# Patient Record
Sex: MALE | Race: WHITE | Employment: OTHER | URBAN - METROPOLITAN AREA
[De-identification: names, ages, dates, MRNs, and addresses within clinical notes are randomized per-mention and may not be internally consistent; named-entity substitution may affect disease eponyms.]

---

## 2020-09-23 ENCOUNTER — APPOINTMENT (OUTPATIENT)
Dept: GENERAL RADIOLOGY | Age: 30
End: 2020-09-23

## 2020-09-23 ENCOUNTER — APPOINTMENT (OUTPATIENT)
Dept: CT IMAGING | Age: 30
End: 2020-09-23

## 2020-09-23 ENCOUNTER — HOSPITAL ENCOUNTER (EMERGENCY)
Age: 30
Discharge: HOME | End: 2020-09-23
Attending: EMERGENCY MEDICINE

## 2020-09-23 VITALS
SYSTOLIC BLOOD PRESSURE: 118 MMHG | BODY MASS INDEX: 25.44 KG/M2 | DIASTOLIC BLOOD PRESSURE: 68 MMHG | OXYGEN SATURATION: 99 % | RESPIRATION RATE: 14 BRPM | HEART RATE: 68 BPM | WEIGHT: 187.83 LBS | HEIGHT: 72 IN | TEMPERATURE: 99 F

## 2020-09-23 PROCEDURE — 6370000000 HC RX 637 (ALT 250 FOR IP): Performed by: EMERGENCY MEDICINE

## 2020-09-23 PROCEDURE — 73700 CT LOWER EXTREMITY W/O DYE: CPT

## 2020-09-23 PROCEDURE — 73630 X-RAY EXAM OF FOOT: CPT

## 2020-09-23 PROCEDURE — 73610 X-RAY EXAM OF ANKLE: CPT

## 2020-09-23 PROCEDURE — 99284 EMERGENCY DEPT VISIT MOD MDM: CPT

## 2020-09-23 RX ORDER — OXYCODONE HYDROCHLORIDE AND ACETAMINOPHEN 5; 325 MG/1; MG/1
1 TABLET ORAL EVERY 6 HOURS PRN
Qty: 12 TABLET | Refills: 0 | Status: SHIPPED | OUTPATIENT
Start: 2020-09-23 | End: 2020-09-26

## 2020-09-23 RX ORDER — ACETAMINOPHEN 500 MG
1000 TABLET ORAL ONCE
Status: COMPLETED | OUTPATIENT
Start: 2020-09-23 | End: 2020-09-23

## 2020-09-23 RX ADMIN — ACETAMINOPHEN 1000 MG: 500 TABLET, FILM COATED ORAL at 12:23

## 2020-09-23 ASSESSMENT — PAIN SCALES - GENERAL
PAINLEVEL_OUTOF10: 6
PAINLEVEL_OUTOF10: 6
PAINLEVEL_OUTOF10: 5
PAINLEVEL_OUTOF10: 6

## 2020-09-23 ASSESSMENT — PAIN DESCRIPTION - PAIN TYPE: TYPE: ACUTE PAIN

## 2020-09-23 ASSESSMENT — PAIN - FUNCTIONAL ASSESSMENT
PAIN_FUNCTIONAL_ASSESSMENT: PREVENTS OR INTERFERES WITH MANY ACTIVE NOT PASSIVE ACTIVITIES
PAIN_FUNCTIONAL_ASSESSMENT: 0-10

## 2020-09-23 ASSESSMENT — PAIN DESCRIPTION - LOCATION: LOCATION: FOOT

## 2020-09-23 ASSESSMENT — PAIN DESCRIPTION - ORIENTATION: ORIENTATION: RIGHT;LEFT

## 2020-09-23 ASSESSMENT — PAIN DESCRIPTION - FREQUENCY: FREQUENCY: INTERMITTENT

## 2020-09-23 ASSESSMENT — PAIN DESCRIPTION - DESCRIPTORS: DESCRIPTORS: SHARP;CONSTANT

## 2020-09-23 NOTE — ED NOTES
Discharge and education instructions reviewed. Patient verbalized understanding, teach back successful. Patient denied questions at this time. Instructed to follow up with PCP and or return to ED if symptoms worsen. Ambulatory to exit with bilateral walking boots on.  States \"feels so much supported/better with boots on\" Pain subsided to [id number], RN  09/23/20 6143

## 2020-09-23 NOTE — ED PROVIDER NOTES
[facility]  eMERGENCY dEPARTMENT eNCOUnter      Pt Name: Tien Watkins  MRN: [id number]  [place] 1990  Date of evaluation: 9/23/2020  Provider: Macario Mcdermott MD    CHIEF COMPLAINT       Chief Complaint   Patient presents with    Foot Injury     c/o bilateral feet injury sustained last night around 11 pm. He was helping his neighbor was cut tree and patient was on ladder when the branch fell off and hit ladder so patient jumped off approximately 10-15 feet of the ground, denies loc         HISTORY OF PRESENT ILLNESS  (Location/Symptom, Timing/Onset, Context/Setting, Quality, Duration, Modifying Factors, Severity.)   Tien Watkins is a 27 y.o. male who presents to the emergency department complaining of pain in both ankles after jumping off a ladder last night around 11 PM.  He was up on a ladder cutting a tree with a chainsaw. He states she was probably 10 to 15 feet up in the air. Part of the tree began to fall on the ladder, so he jumped off the ladder. He states she landed on his feet and then rolled. He states he really did not have much pain initially, but about an hour afterwards he began having severe pain in both ankles radiating down into his feet. He is really not having any pain in his heels. He denies any low back pain, mid back pain, or neck pain. No head injury. No chest or abdominal pain. No other complaints. He is not taking anything for the pain. He states the pain in his ankles was so severe that it kept him up most the night. He states the pain is equally severe in the right and left ankles. Nursing Notes were reviewed and I agree. REVIEW OF SYSTEMS    (2-9 systems for level 4, 10 or more for level 5)     HEENT: No head or facial injury. Cardiovascular: No chest or rib pain. Pulmonary: No shortness of breath. GI: No abdominal pain nausea vomiting. Musculoskeletal: No neck or back pain. No hip or knee pain.   He has severe bilateral ankle pain. He has some pain in his midfoot area. He denies any significant pain in his heels. Skin: No bruising, lacerations, or abrasions. Neuro: No extremity weakness numbness or tingling. Except as noted above the remainder of the review of systems was reviewed and negative. PAST MEDICAL HISTORY   No past medical history on file. SURGICAL HISTORY     No past surgical history on file. CURRENT MEDICATIONS       Previous Medications    No medications on file       ALLERGIES     Naprosyn [naproxen]    FAMILY HISTORY     No family history on file. No family status information on file. SOCIAL HISTORY      reports that he has been smoking cigarettes. He has been smoking about 0.50 packs per day. He has never used smokeless tobacco. He reports current alcohol use. He reports that he does not use drugs. PHYSICAL EXAM    (up to 7 for level 4, 8 or more for level 5)     ED Triage Vitals [09/23/20 1150]   BP Temp Temp Source Pulse Resp SpO2 Height Weight   122/80 99 °F (37.2 °C) Oral 82 14 98 % 6' (1.829 m) 187 lb 13.3 oz (85.2 kg)       General: Alert thin white male no acute distress. Head: Atraumatic and normocephalic. Eyes: No conjunctival injection. Pupils equal round reactive. Extraocular movements are intact. ENT: TMs are normal.  Nose clear. Oropharynx is negative. No facial trauma. Neck: Supple, nontender, no adenopathy. Chest wall: Nontender. Heart: Regular rate and rhythm. No murmurs or gallops noted. Lungs: Breath sounds equal bilaterally and clear. Abdomen: Soft, nondistended, nontender. Musculoskeletal: Full range of motion of both hips and knees without pain. No bony tenderness in the hips or knees. He has mild diffuse swelling of both ankles, he has medial and lateral ankle tenderness bilaterally. He has limited movement of the ankle secondary to pain. He has no significant tenderness to the calcaneus.   He has some mild diffuse tenderness in the midfoot area bilaterally without swelling. Distal foot is nontender. He has intact distal pulses. He moves his toes well. Skin: Warm and dry, good turgor. No bruising, lacerations, or abrasions to the lower extremities. Neuro: Awake, alert, oriented. Symmetrical reactive pupils. Intact extraocular movements. No facial asymmetry. Symmetrical motor function. Limping gait. DIAGNOSTIC RESULTS     RADIOLOGY:   Non-plain film images such as CT, Ultrasound and MRI are read by the radiologist. Plain radiographic images are visualized and preliminarily interpreted by Adriel Pinto MD with the below findings:      Interpretation per the Radiologist below, if available at the time of this note:    CT FOOT LEFT WO CONTRAST   Final Result   No acute osseous abnormality. CT ANKLE RIGHT WO CONTRAST   Final Result   1. No acute osseous abnormality. 2. Mild lateral subcutaneous edema. XR FOOT RIGHT (MIN 3 VIEWS)   Final Result   Right ankle: Mild lateral soft tissue swelling. Age indeterminate tiny   slightly displaced fracture of the anterior tibia with a 4 mm curvilinear   fragment. Right foot: Age indeterminate 4 mm tiny linear fracture fragment versus   chronic heterotopic soft tissue ossification along the dorsal aspect of the   navicular. XR ANKLE LEFT (MIN 3 VIEWS)   Final Result   Possible nondisplaced fracture of the calcaneus      Otherwise, no acute bony or joint abnormality         XR ANKLE RIGHT (MIN 3 VIEWS)   Final Result   Right ankle: Mild lateral soft tissue swelling. Age indeterminate tiny   slightly displaced fracture of the anterior tibia with a 4 mm curvilinear   fragment. Right foot: Age indeterminate 4 mm tiny linear fracture fragment versus   chronic heterotopic soft tissue ossification along the dorsal aspect of the   navicular.          XR FOOT LEFT (MIN 3 VIEWS)   Final Result   Possible nondisplaced fracture of the calcaneus      Otherwise, no acute bony or joint abnormality             LABS:  Labs Reviewed - No data to display    All other labs were within normal range or not returned as of this dictation. EMERGENCY DEPARTMENT COURSE and DIFFERENTIAL DIAGNOSIS/MDM:   Vitals:    Vitals:    09/23/20 1150 09/23/20 1315   BP: 122/80 118/68   Pulse: 82 68   Resp: 14 14   Temp: 99 °F (37.2 °C)    TempSrc: Oral    SpO2: 98% 99%   Weight: 187 lb 13.3 oz (85.2 kg)    Height: 6' (1.829 m)        This patient jumped off of a ladder about 10 to 15 feet up and landed on his feet. The injury occurred last night. He was up most of the night with pain in his ankles. He has some swelling in his ankles bilaterally. He is able to weight-bear and he was able to walk in. He has significant pain with range of motion. Initial x-rays of his ankles and feet bilaterally showed some questionable subtle findings. I elected to do a CT of his right ankle and a CT of his left foot based on these findings. The CT scan of the right ankle showed no evidence of acute fracture or other acute abnormality. The CT of the left foot showed no evidence of acute fracture or other acute abnormality. He has no calcaneal tenderness of significance. He has no back or neck pain or other injuries. Due to the amount of swelling and pain he has I still have some concern about occult fracture. I am going to place him in walker boots bilaterally. He has crutches at home as needed. I wrote for short-term pain control. I gave him an orthopedic referral.  I instructed him to call the orthopedic for follow-up. I told him he may need further imaging including an MRI if he has continued significant pain. His pain is significant enough that I am still concerned about occult fracture.     Controlled Substance Monitoring:    Acute and Chronic Pain Monitoring:   RX Monitoring 9/23/2020   Periodic Controlled Substance Monitoring Possible medication side effects, risk of tolerance/dependence & alternative

## 2020-09-23 NOTE — ED NOTES
Presents with bilateral feet injury sustained last night around 11 pm. He was helping his neighbor cut a tree and was on top of a ladder when a branch hit the ladder so then he jumped off approximately 10-15 feet, landed on his feet. Denies head injury no loss of consciousness, denies neck pain, denies back pain. C/o pain all night. Bilateral feet tender to touch with mild edema, pedal pulses present. Cap refill less than 2 secs.  Denies other injury     Bobby Sellers, [place]  09/23/20 [address] [facility], [place]  09/23/20 1300

## 2020-10-13 ENCOUNTER — APPOINTMENT (OUTPATIENT)
Dept: CT IMAGING | Age: 30
End: 2020-10-13

## 2020-10-13 ENCOUNTER — HOSPITAL ENCOUNTER (EMERGENCY)
Age: 30
Discharge: ANOTHER HOSPITAL | End: 2020-10-13
Attending: EMERGENCY MEDICINE

## 2020-10-13 ENCOUNTER — APPOINTMENT (OUTPATIENT)
Dept: GENERAL RADIOLOGY | Age: 30
End: 2020-10-13

## 2020-10-13 VITALS
SYSTOLIC BLOOD PRESSURE: 142 MMHG | OXYGEN SATURATION: 98 % | BODY MASS INDEX: 25.77 KG/M2 | DIASTOLIC BLOOD PRESSURE: 81 MMHG | WEIGHT: 190.26 LBS | TEMPERATURE: 98.9 F | RESPIRATION RATE: 18 BRPM | HEIGHT: 72 IN | HEART RATE: 98 BPM

## 2020-10-13 PROCEDURE — 6370000000 HC RX 637 (ALT 250 FOR IP): Performed by: NURSE PRACTITIONER

## 2020-10-13 PROCEDURE — 99284 EMERGENCY DEPT VISIT MOD MDM: CPT

## 2020-10-13 PROCEDURE — 70450 CT HEAD/BRAIN W/O DYE: CPT

## 2020-10-13 PROCEDURE — 71046 X-RAY EXAM CHEST 2 VIEWS: CPT

## 2020-10-13 PROCEDURE — 71250 CT THORAX DX C-: CPT

## 2020-10-13 RX ORDER — OXYCODONE HYDROCHLORIDE AND ACETAMINOPHEN 5; 325 MG/1; MG/1
1 TABLET ORAL ONCE
Status: COMPLETED | OUTPATIENT
Start: 2020-10-13 | End: 2020-10-13

## 2020-10-13 RX ORDER — LIDOCAINE 4 G/G
1 PATCH TOPICAL DAILY
Status: DISCONTINUED | OUTPATIENT
Start: 2020-10-14 | End: 2020-10-13

## 2020-10-13 RX ORDER — CYCLOBENZAPRINE HCL 10 MG
10 TABLET ORAL ONCE
Status: COMPLETED | OUTPATIENT
Start: 2020-10-13 | End: 2020-10-13

## 2020-10-13 RX ADMIN — OXYCODONE HYDROCHLORIDE AND ACETAMINOPHEN 1 TABLET: 5; 325 TABLET ORAL at 21:15

## 2020-10-13 RX ADMIN — CYCLOBENZAPRINE HYDROCHLORIDE 10 MG: 10 TABLET, FILM COATED ORAL at 21:15

## 2020-10-13 ASSESSMENT — ENCOUNTER SYMPTOMS
FACIAL SWELLING: 0
BACK PAIN: 1
ABDOMINAL PAIN: 0
ABDOMINAL DISTENTION: 0
VOMITING: 0
SHORTNESS OF BREATH: 0
COLOR CHANGE: 0
COUGH: 0
NAUSEA: 0

## 2020-10-13 ASSESSMENT — PAIN SCALES - GENERAL: PAINLEVEL_OUTOF10: 7
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
## 2022-03-17 ENCOUNTER — HOSPITAL ENCOUNTER (EMERGENCY)
Age: 32
Discharge: HOME | End: 2022-03-18
Attending: EMERGENCY MEDICINE

## 2022-03-17 ENCOUNTER — APPOINTMENT (OUTPATIENT)
Dept: GENERAL RADIOLOGY | Age: 32
End: 2022-03-17

## 2022-03-17 DIAGNOSIS — S82.891A CLOSED FRACTURE OF RIGHT ANKLE, INITIAL ENCOUNTER: Primary | ICD-10-CM

## 2022-03-17 PROCEDURE — 99284 EMERGENCY DEPT VISIT MOD MDM: CPT

## 2022-03-17 PROCEDURE — 73610 X-RAY EXAM OF ANKLE: CPT

## 2022-03-17 PROCEDURE — 73630 X-RAY EXAM OF FOOT: CPT

## 2022-03-17 ASSESSMENT — PAIN DESCRIPTION - FREQUENCY: FREQUENCY: CONTINUOUS

## 2022-03-17 ASSESSMENT — PAIN DESCRIPTION - ORIENTATION: ORIENTATION: RIGHT

## 2022-03-17 ASSESSMENT — PAIN DESCRIPTION - PAIN TYPE: TYPE: ACUTE PAIN

## 2022-03-17 ASSESSMENT — PAIN DESCRIPTION - DESCRIPTORS: DESCRIPTORS: ACHING

## 2022-03-17 ASSESSMENT — PAIN - FUNCTIONAL ASSESSMENT: PAIN_FUNCTIONAL_ASSESSMENT: 0-10

## 2022-03-17 ASSESSMENT — PAIN SCALES - GENERAL: PAINLEVEL_OUTOF10: 10

## 2022-03-17 ASSESSMENT — PAIN DESCRIPTION - LOCATION: LOCATION: ANKLE

## 2022-03-17 NOTE — LETTER
[facility] Emergency Department  [address] [address]  Phone: [phone]             March 18, 2022    Patient: Sarahy Sharp   YOB: 1990   Date of Visit: 3/17/2022       To Whom It May Concern:    Mary Barlow was seen and treated in our emergency department on 3/17/2022. He may return to work after seeing orthopedic surgery.      Sincerely,             Signature:__________________________________

## 2022-03-18 ENCOUNTER — OFFICE VISIT (OUTPATIENT)
Dept: ORTHOPEDIC SURGERY | Age: 32
End: 2022-03-18

## 2022-03-18 VITALS
OXYGEN SATURATION: 100 % | BODY MASS INDEX: 25.8 KG/M2 | RESPIRATION RATE: 14 BRPM | TEMPERATURE: 97.8 F | HEIGHT: 72 IN | HEART RATE: 88 BPM | DIASTOLIC BLOOD PRESSURE: 74 MMHG | SYSTOLIC BLOOD PRESSURE: 138 MMHG

## 2022-03-18 VITALS — BODY MASS INDEX: 25.73 KG/M2 | HEIGHT: 72 IN | WEIGHT: 190 LBS

## 2022-03-18 DIAGNOSIS — S82.61XA CLOSED DISPLACED FRACTURE OF LATERAL MALLEOLUS OF RIGHT FIBULA, INITIAL ENCOUNTER: Primary | ICD-10-CM

## 2022-03-18 DIAGNOSIS — F17.200 CURRENT SMOKER: ICD-10-CM

## 2022-03-18 DIAGNOSIS — S92.251A CLOSED AVULSION FRACTURE OF NAVICULAR BONE OF RIGHT FOOT, INITIAL ENCOUNTER: ICD-10-CM

## 2022-03-18 PROCEDURE — 99406 BEHAV CHNG SMOKING 3-10 MIN: CPT | Performed by: ORTHOPAEDIC SURGERY

## 2022-03-18 PROCEDURE — 28450 TX TARSAL B1 FX W/O MNPJ EA: CPT | Performed by: ORTHOPAEDIC SURGERY

## 2022-03-18 PROCEDURE — 99203 OFFICE O/P NEW LOW 30 MIN: CPT | Performed by: ORTHOPAEDIC SURGERY

## 2022-03-18 PROCEDURE — 6370000000 HC RX 637 (ALT 250 FOR IP): Performed by: EMERGENCY MEDICINE

## 2022-03-18 PROCEDURE — 27786 TREATMENT OF ANKLE FRACTURE: CPT | Performed by: ORTHOPAEDIC SURGERY

## 2022-03-18 PROCEDURE — L4361 PNEUMA/VAC WALK BOOT PRE OTS: HCPCS | Performed by: ORTHOPAEDIC SURGERY

## 2022-03-18 RX ORDER — OXYCODONE HYDROCHLORIDE AND ACETAMINOPHEN 5; 325 MG/1; MG/1
1 TABLET ORAL ONCE
Status: COMPLETED | OUTPATIENT
Start: 2022-03-18 | End: 2022-03-18

## 2022-03-18 RX ORDER — TRAMADOL HYDROCHLORIDE 50 MG/1
50 TABLET ORAL EVERY 4 HOURS PRN
Qty: 5 TABLET | Refills: 0 | Status: SHIPPED | OUTPATIENT
Start: 2022-03-18 | End: 2022-03-21

## 2022-03-18 RX ORDER — HYDROCODONE BITARTRATE AND ACETAMINOPHEN 5; 325 MG/1; MG/1
1 TABLET ORAL EVERY 4 HOURS PRN
Qty: 6 TABLET | Refills: 0 | Status: SHIPPED | OUTPATIENT
Start: 2022-03-18 | End: 2022-03-18

## 2022-03-18 RX ORDER — HYDROCODONE BITARTRATE AND ACETAMINOPHEN 5; 325 MG/1; MG/1
1 TABLET ORAL EVERY 6 HOURS PRN
Qty: 20 TABLET | Refills: 0 | Status: SHIPPED | OUTPATIENT
Start: 2022-03-18 | End: 2022-03-23

## 2022-03-18 RX ADMIN — OXYCODONE AND ACETAMINOPHEN 1 TABLET: 5; 325 TABLET ORAL at 00:28

## 2022-03-18 ASSESSMENT — PAIN DESCRIPTION - PROGRESSION: CLINICAL_PROGRESSION: GRADUALLY IMPROVING

## 2022-03-18 ASSESSMENT — PAIN DESCRIPTION - FREQUENCY: FREQUENCY: INTERMITTENT

## 2022-03-18 ASSESSMENT — PAIN DESCRIPTION - ONSET: ONSET: ON-GOING

## 2022-03-18 ASSESSMENT — PAIN DESCRIPTION - ORIENTATION: ORIENTATION: RIGHT

## 2022-03-18 ASSESSMENT — ENCOUNTER SYMPTOMS
DIARRHEA: 0
APNEA: 0
BACK PAIN: 0
CONSTIPATION: 0
EYE DISCHARGE: 0
SHORTNESS OF BREATH: 0
ABDOMINAL PAIN: 0
RECTAL PAIN: 0
COUGH: 0
CHEST TIGHTNESS: 0
PHOTOPHOBIA: 0
BLOOD IN STOOL: 0
EYE PAIN: 0
ANAL BLEEDING: 0
WHEEZING: 0
EYE REDNESS: 0
EYE ITCHING: 0
NAUSEA: 0
STRIDOR: 0
CHOKING: 0
COLOR CHANGE: 0
VOMITING: 0
ABDOMINAL DISTENTION: 0

## 2022-03-18 ASSESSMENT — PAIN DESCRIPTION - LOCATION: LOCATION: ANKLE

## 2022-03-18 ASSESSMENT — PAIN - FUNCTIONAL ASSESSMENT: PAIN_FUNCTIONAL_ASSESSMENT: PREVENTS OR INTERFERES WITH MANY ACTIVE NOT PASSIVE ACTIVITIES

## 2022-03-18 ASSESSMENT — PAIN DESCRIPTION - DESCRIPTORS: DESCRIPTORS: ACHING

## 2022-03-18 ASSESSMENT — PAIN SCALES - GENERAL
PAINLEVEL_OUTOF10: 10
PAINLEVEL_OUTOF10: 2

## 2022-03-18 ASSESSMENT — PAIN DESCRIPTION - PAIN TYPE: TYPE: ACUTE PAIN

## 2022-03-18 NOTE — LETTER
[facility]  [facility] [address] [address]  Phone: [phone]  Fax: [phone]    Peterson Mcmullen MD        March 18, 2022     Patient: Sarahy Sharp   YOB: 1990   Date of Visit: 3/18/2022       To Whom It May Concern: It is my medical opinion that Mary Barlow may remain out of work for 6 weeks. (April 30th, 2022)  If you have any questions or concerns, please don't hesitate to call.     Sincerely,        Peterson Mcmullen MD

## 2022-03-18 NOTE — PROGRESS NOTES
CHIEF COMPLAINT: Right ankle pain / lateral malleolus and navicular avulsion fracture. DATE OF INJURY: 3/17/2022, DOT 3/18/2022    HISTORY:  Mr. Kristyn Jenkins 28 y.o.  male presents today for the first visit for evaluation of a right ankle injury which occurred when he was walking and tripped. He was first seen and evaluated in [facility], where he was x-rayed, splinted and asked to f/u with Orthopedics. He is complaining of medial & lateral ankle pain and swelling. This is better with elevation and worse with bearing any wt. The pain is sharp and not radiating. No numbness or tingling sensation. Alleviating factors: elevation and rest. No other complaint. History reviewed. No pertinent past medical history. Past Surgical History:   Procedure Laterality Date    ARM SURGERY      HAND SURGERY Right 2021       Social History     Socioeconomic History    Marital status:      Spouse name: Not on file    Number of children: Not on file    Years of education: Not on file    Highest education level: Not on file   Occupational History    Not on file   Tobacco Use    Smoking status: Current Every Day Smoker     Packs/day: 0.50     Types: Cigarettes    Smokeless tobacco: Never Used   Substance and Sexual Activity    Alcohol use: Yes     Comment: occasionally     Drug use: No    Sexual activity: Not on file   Other Topics Concern    Not on file   Social History Narrative    Not on file     Social Determinants of Health     Financial Resource Strain:     Difficulty of Paying Living Expenses: Not on file   Food Insecurity:     Worried About Running Out of Food in the Last Year: Not on file    Lluvia of Food in the Last Year: Not on file   Transportation Needs:     Lack of Transportation (Medical): Not on file    Lack of Transportation (Non-Medical):  Not on file   Physical Activity:     Days of Exercise per Week: Not on file    Minutes of Exercise per Session: Not on file   Stress:     Feeling of well as ecchymosis. He has intact sensation and good pedal pulses. He has significant tenderness on deep palpation over the medial & lateral malleolus of the right ankle. IMAGING: Xrays, 3 views of the right ankle dated 3/17/2022 from [facility], were reviewed, and showed a minimally displaced lateral malleolus and navicular avulsion fracture. IMPRESSION: Right ankle pain / lateral malleolus and navicular avulsion fracture. PLAN:  I discussed that the overall alignment of these fractures are good and that we can try to treat this non-operatively in a boot WBAT. We discussed the risk of nonunion and or malunion. We applied a boot today in the office and instructed him  in care. We will see him  back in 6 weeks at which time we will get a new xray of the right ankle. The patient smokes cigarettes, and we discussed with the patient the risks of smoking on general health and also on bone and soft tissue healing (delay and non-union), and promised to cut down or stop smoking. Smoking: Educated the patient regarding the hazards of smoking and that it harms their body in many ways. It increases the chance of developing heart disease, lung disease, cancer, and other health problems including poor bone and wound healing. The importance of smoking cessation for optimal bone and wound healing was stressed. This was communicated verbally, 5 Minutes. Procedures    Breg Tall Brittany Walking Boot     Patient was prescribed a Breg Tall Brittany Walking Boot. The right ankle will require stabilization / immobilization from this semi-rigid / rigid orthosis to improve their function. The orthosis will assist in protecting the affected area, provide functional support and facilitate healing. Patient was instructed to progress ambulation weight bearing as tolerated in the device.      The patient was educated and fit by a healthcare professional with expert knowledge and specialization in brace application while under the direct supervision of the physician. Verbal and written instructions for the use of and application of this item were provided. They were instructed to contact the office immediately should the brace result in increased pain, decreased sensation, increased swelling or worsening of the condition.      Casi Yang MD

## 2022-03-18 NOTE — ED PROVIDER NOTES
[address] [facility]      Pt Name: Ted Pedraza  MRN: [id number]  [place] 1990  Date of evaluation: 3/17/2022  Provider: Jennifer Monte MD    CHIEF COMPLAINT       Chief Complaint   Patient presents with    Ankle Pain     right ankle injury today swelling noted        HISTORY OF PRESENT ILLNESS    Ted Pedraza is a 28 y.o. male who presents to the emergency department with ankle pain. Ankle pain after rolling it and injuring it earlier today. 7 out of 10 achy nature. Worse with movement. Better with rest.  No other injuries or insults. Has not taken anything for pain. Started spontaneously. Constant in nature. No other associated symptoms. Nursing Notes were reviewed. Including nursing noted for FM, Surgical History, Past Medical History, Social History, vitals, and allergies; agree with all. REVIEW OF SYSTEMS       Review of Systems   Constitutional: Negative for activity change, appetite change, chills, diaphoresis, fatigue, fever and unexpected weight change. HENT: Negative for congestion, dental problem, drooling, ear discharge and ear pain. Eyes: Negative for photophobia, pain, discharge, redness, itching and visual disturbance. Respiratory: Negative for apnea, cough, choking, chest tightness, shortness of breath, wheezing and stridor. Cardiovascular: Negative for chest pain, palpitations and leg swelling. Gastrointestinal: Negative for abdominal distention, abdominal pain, anal bleeding, blood in stool, constipation, diarrhea, nausea, rectal pain and vomiting. Endocrine: Negative for cold intolerance and heat intolerance. Genitourinary: Negative for decreased urine volume and urgency. Musculoskeletal: Positive for arthralgias. Negative for back pain. Skin: Negative for color change and pallor. Neurological: Negative for tremors, facial asymmetry and weakness. Hematological: Negative for adenopathy.  Does not bruise/bleed easily. Psychiatric/Behavioral: Negative for agitation, behavioral problems, confusion and decreased concentration. Except as noted above the remainder of the review of systems was reviewed and negative. PAST MEDICAL HISTORY   History reviewed. No pertinent past medical history. SURGICAL HISTORY       Past Surgical History:   Procedure Laterality Date    ARM SURGERY         CURRENT MEDICATIONS       Previous Medications    No medications on file       ALLERGIES     Naprosyn [naproxen]    FAMILY HISTORY      History reviewed. No pertinent family history. SOCIAL HISTORY       Social History     Socioeconomic History    Marital status:      Spouse name: None    Number of children: None    Years of education: None    Highest education level: None   Occupational History    None   Tobacco Use    Smoking status: Current Every Day Smoker     Packs/day: 0.50     Types: Cigarettes    Smokeless tobacco: Never Used   Substance and Sexual Activity    Alcohol use: Yes     Comment: occasionally     Drug use: No    Sexual activity: None   Other Topics Concern    None   Social History Narrative    None     Social Determinants of Health     Financial Resource Strain:     Difficulty of Paying Living Expenses: Not on file   Food Insecurity:     Worried About Running Out of Food in the Last Year: Not on file    Lluvia of Food in the Last Year: Not on file   Transportation Needs:     Lack of Transportation (Medical): Not on file    Lack of Transportation (Non-Medical):  Not on file   Physical Activity:     Days of Exercise per Week: Not on file    Minutes of Exercise per Session: Not on file   Stress:     Feeling of Stress : Not on file   Social Connections:     Frequency of Communication with Friends and Family: Not on file    Frequency of Social Gatherings with Friends and Family: Not on file    Attends Shinto Services: Not on file   [employer] of [employer]s or Organizations: Not on file    Attends Club or Organization Meetings: Not on file    Marital Status: Not on file   Intimate Partner Violence:     Fear of Current or Ex-Partner: Not on file    Emotionally Abused: Not on file    Physically Abused: Not on file    Sexually Abused: Not on file   Housing Stability:     Unable to Pay for Housing in the Last Year: Not on file    Number of [place] in the Last Year: Not on file    Unstable Housing in the Last Year: Not on file       PHYSICAL EXAM       ED Triage Vitals [03/17/22 2141]   BP Temp Temp Source Pulse Resp SpO2 Height Weight   -- 97.8 °F (36.6 °C) Oral 96 16 100 % 6' (1.829 m) --       Physical Exam  Vitals and nursing note reviewed. Constitutional:       General: He is not in acute distress. Appearance: He is well-developed. He is not diaphoretic. HENT:      Head: Normocephalic and atraumatic. Eyes:      General:         Right eye: No discharge. Left eye: No discharge. Pupils: Pupils are equal, round, and reactive to light. Neck:      Thyroid: No thyromegaly. Trachea: No tracheal deviation. Cardiovascular:      Rate and Rhythm: Normal rate and regular rhythm. Heart sounds: No murmur heard. Pulmonary:      Breath sounds: No wheezing or rales. Chest:      Chest wall: No tenderness. Abdominal:      General: There is no distension. Palpations: Abdomen is soft. There is no mass. Tenderness: There is no abdominal tenderness. There is no guarding or rebound. Musculoskeletal:         General: Tenderness present. No deformity. Normal range of motion. Cervical back: Normal range of motion. Comments: No cuts or abrasions noted warm well perfused foot. Minimal tenderness palpation. Pulses present. Skin:     General: Skin is warm. Coloration: Skin is not pale. Findings: No erythema or rash. Neurological:      Mental Status: He is alert.       Cranial Nerves: No cranial nerve deficit. Motor: No abnormal muscle tone. Coordination: Coordination normal.         DIAGNOSTIC RESULTS     RADIOLOGY:   Non-plain film images such as CT, Ultrasoundand MRI are read by the radiologist. Plain radiographic images are visualized and preliminarily interpreted by the emergency physician with the below findings:       Impression   1. Suspected tiny subtle acute cortical fracture fragments at the caudal   aspect of the lateral malleolus. 2. Soft tissue swelling overlying the lateral ankle. 3. No acute abnormality of the right foot.         ED BEDSIDE ULTRASOUND:   Performed by ED Physician - none    LABS:  Labs Reviewed - No data to display    All other labs were withinnormal range or not returned as of this dictation. EMERGENCY DEPARTMENT COURSE and DIFFERENTIAL DIAGNOSIS/MDM:     PMH, Surgical Hx, FH, Social Hx reviewed by myself (ETOH usage, Tobacco usage, Drug usage reviewed by myself, no pertinent Hx)- No Pertinent Hx     Old records were reviewed by me     42-year-old with fracture of the right ankle. Short leg post mold. Crutches. Nonweightbearing. Orthopedic follow-up. I estimate there is LOW risk for Sepsis, MI, Stroke, Tamponade, PTX, Toxicity or other life threatening etiology thus I consider the discharge disposition reasonable. The patient is at low risk for mortality based on demographic, history and clinical factors. Given the best available information and clinical assessment, I estimate the risk of hospitalization to be greater than risk of treatment at home. I have explained to the patient that the risk could rapidly change, given precautions for return and instructions. Explained to patient that the risk for mortality is low based on demographic, history and clinical factors. I discussed with patient the results of evaluation in the ED, diagnosis, care, and prognosis. The plan is to discharge to home.   Patient is in agreement with plan and questions have been answered. I also discussed with patient the reasons which may require a return visit and the importance of follow-up care. The patient is well-appearing, nontoxic, and improved at the time of discharge. Patient agrees to call to arrange follow-up care as directed. Patient understands to return immediately for worsening/change in symptoms. CRITICAL CARE TIME   Total Critical Caretime was 21 minutes, excluding separately reportable procedures. There was a high probability of clinically significant/life threatening deterioration in the patient's condition which required my urgent intervention. PROCEDURES:  Unlessotherwise noted below, none    FINAL IMPRESSION      1.  Closed fracture of right ankle, initial encounter          DISPOSITION/PLAN   DISPOSITION Decision To Discharge 03/18/2022 12:15:25 AM    PATIENT REFERRED TO:  Peterson Mcmullen MD  [address] [facility]  [address] [place] [address]            DISCHARGE MEDICATIONS:  New Prescriptions    No medications on file          (Please note that portions ofthis note were completed with a voice recognition program.  Efforts were made to edit the dictations but occasionally words are mis-transcribed.)    Sapphire Wells MD(electronically signed)  Attending Emergency Physician        Sapphire Wells MD  03/18/22 0657

## 2022-03-20 PROBLEM — F17.200 CURRENT SMOKER: Status: ACTIVE | Noted: 2022-03-20

## 2022-03-20 PROBLEM — S92.251A CLOSED AVULSION FRACTURE OF NAVICULAR BONE OF RIGHT FOOT: Status: ACTIVE | Noted: 2022-03-20

## 2022-03-20 PROBLEM — S82.61XA CLOSED DISPLACED FRACTURE OF LATERAL MALLEOLUS OF RIGHT FIBULA: Status: ACTIVE | Noted: 2022-03-20

## 2022-03-21 NOTE — TELEPHONE ENCOUNTER
=================[id number]=================  Taken 06:41:47 pm 03/18/22 Oper. 21  Dlvrd 06:43:44 pm 03/18/22 To CMD          ALPHA-NUMERIC PAGE          Terminal No. : [id number]        Pager Number :  Fuadangel Santiagos    Message : Parish Li [phone]   SAW DR MAC THIS MORNING FOR FRA  CTURED ANKLE.  [facility] NOT CALLED IN 2 /12/1990

## 2022-06-29 ENCOUNTER — HOSPITAL ENCOUNTER (EMERGENCY)
Age: 32
Discharge: HOME | End: 2022-06-29
Payer: COMMERCIAL

## 2022-06-29 VITALS
RESPIRATION RATE: 18 BRPM | DIASTOLIC BLOOD PRESSURE: 98 MMHG | SYSTOLIC BLOOD PRESSURE: 154 MMHG | TEMPERATURE: 97.3 F | OXYGEN SATURATION: 99 % | HEART RATE: 80 BPM

## 2022-06-29 DIAGNOSIS — K02.9 DENTAL CARIES: Primary | ICD-10-CM

## 2022-06-29 DIAGNOSIS — K04.7 DENTAL INFECTION: ICD-10-CM

## 2022-06-29 DIAGNOSIS — M79.2 NEUROPATHIC PAIN OF HAND, RIGHT: ICD-10-CM

## 2022-06-29 DIAGNOSIS — K03.81 CRACKED TOOTH: ICD-10-CM

## 2022-06-29 PROCEDURE — 99283 EMERGENCY DEPT VISIT LOW MDM: CPT

## 2022-06-29 PROCEDURE — 6370000000 HC RX 637 (ALT 250 FOR IP)

## 2022-06-29 RX ORDER — CLOVE OIL
OIL (ML) MISCELLANEOUS ONCE
Status: COMPLETED | OUTPATIENT
Start: 2022-06-29 | End: 2022-06-29

## 2022-06-29 RX ORDER — LIDOCAINE 4 G/G
1 PATCH TOPICAL DAILY
Status: DISCONTINUED | OUTPATIENT
Start: 2022-06-30 | End: 2022-06-30 | Stop reason: HOSPADM

## 2022-06-29 RX ORDER — GABAPENTIN 400 MG/1
400 CAPSULE ORAL 3 TIMES DAILY
Status: DISCONTINUED | OUTPATIENT
Start: 2022-06-29 | End: 2022-06-30 | Stop reason: HOSPADM

## 2022-06-29 RX ORDER — PENICILLIN V POTASSIUM 500 MG/1
500 TABLET ORAL 4 TIMES DAILY
Qty: 28 TABLET | Refills: 0 | Status: SHIPPED | OUTPATIENT
Start: 2022-06-29 | End: 2022-07-06

## 2022-06-29 RX ORDER — ACETAMINOPHEN 500 MG
1000 TABLET ORAL ONCE
Status: COMPLETED | OUTPATIENT
Start: 2022-06-29 | End: 2022-06-29

## 2022-06-29 RX ORDER — LIDOCAINE HYDROCHLORIDE 20 MG/ML
15 SOLUTION OROPHARYNGEAL ONCE
Status: COMPLETED | OUTPATIENT
Start: 2022-06-29 | End: 2022-06-29

## 2022-06-29 RX ORDER — LIDOCAINE HYDROCHLORIDE 20 MG/ML
15 SOLUTION OROPHARYNGEAL EVERY 4 HOURS PRN
Qty: 2 EACH | Refills: 0 | Status: SHIPPED | OUTPATIENT
Start: 2022-06-29 | End: 2022-07-04

## 2022-06-29 RX ADMIN — LIDOCAINE HYDROCHLORIDE 15 ML: 20 SOLUTION ORAL at 22:03

## 2022-06-29 RX ADMIN — GABAPENTIN 400 MG: 400 CAPSULE ORAL at 22:40

## 2022-06-29 RX ADMIN — ACETAMINOPHEN 1000 MG: 500 TABLET ORAL at 22:03

## 2022-06-29 RX ADMIN — Medication: at 22:03

## 2022-06-29 ASSESSMENT — PAIN DESCRIPTION - FREQUENCY
FREQUENCY: CONTINUOUS
FREQUENCY: CONTINUOUS

## 2022-06-29 ASSESSMENT — PAIN DESCRIPTION - DESCRIPTORS
DESCRIPTORS: ACHING
DESCRIPTORS: ACHING

## 2022-06-29 ASSESSMENT — PAIN DESCRIPTION - LOCATION
LOCATION: FACE

## 2022-06-29 ASSESSMENT — PAIN - FUNCTIONAL ASSESSMENT
PAIN_FUNCTIONAL_ASSESSMENT: 0-10
PAIN_FUNCTIONAL_ASSESSMENT: 0-10

## 2022-06-29 ASSESSMENT — PAIN SCALES - GENERAL
PAINLEVEL_OUTOF10: 4
PAINLEVEL_OUTOF10: 9
PAINLEVEL_OUTOF10: 9

## 2022-06-29 ASSESSMENT — PAIN DESCRIPTION - PAIN TYPE: TYPE: ACUTE PAIN

## 2022-06-30 ASSESSMENT — ENCOUNTER SYMPTOMS
CONSTIPATION: 0
SHORTNESS OF BREATH: 0
EYE PAIN: 0
VOMITING: 0
TROUBLE SWALLOWING: 0
ABDOMINAL PAIN: 0
SORE THROAT: 0
COUGH: 0
RHINORRHEA: 0
NAUSEA: 0
BACK PAIN: 0
FACIAL SWELLING: 0
DIARRHEA: 0

## 2022-06-30 NOTE — ED PROVIDER NOTES
[address] [facility]        Pt Name: Dalia West  MRN: [id number]  [place] 1990  Date of evaluation: 6/29/2022  Provider: RADHA Schreiber CNP  PCP: No primary care provider on file. Note Started: 12:45 AM EDT      KALI. I have evaluated this patient. My supervising physician was available for consultation. Triage CHIEF COMPLAINT       Chief Complaint   Patient presents with    Dental Pain     left side x 1 day    Hand Pain     right, chromic s/o wrist fx         HISTORY OF PRESENT ILLNESS   (Location/Symptom, Timing/Onset, Context/Setting, Quality, Duration, Modifying Factors, Severity)  Note limiting factors. Dalia West is a 28 y.o. male who presents to the ED for evaluation of chronic right hand pain and dental pain. He reports dental pain which has been on for several years. He is unsure exactly how long he has had dental caries. Chart review shows that this has been a problem since at least 2018. Patient states his caries are worse. He is working 6 days a week and is unable to go see a dentist.  He also reports chronic right hand neuropathic pain for which he was taking 10 mg of Percocet and 40 mg of gabapentin every day. He cannot tell me when he last took these medications. He states he has had neuropathic pain after surgery. There is a visible surgical scar on the distal aspect of his right forearm. He cannot tell me the last time I saw PCP or pain .  PDMP does show occasional refills for narcotics as well as a last refill of 7/20/2021 for 90 gabapentin for 400 mg. Nursing Notes were all reviewed and agreed with or any disagreements were addressed in the HPI. REVIEW OF SYSTEMS    (2-9 systems for level 4, 10 or more for level 5)     Review of Systems   Constitutional: Negative for chills, diaphoresis and fever. HENT: Positive for dental problem.  Negative for congestion, facial swelling, rhinorrhea, sore throat and trouble swallowing. Eyes: Negative for pain and visual disturbance. Respiratory: Negative for cough and shortness of breath. Cardiovascular: Negative for chest pain and leg swelling. Gastrointestinal: Negative for abdominal pain, constipation, diarrhea, nausea and vomiting. Genitourinary: Negative for frequency and hematuria. Musculoskeletal: Negative for back pain and neck pain. Skin: Negative for rash and wound. Neurological: Negative for dizziness and light-headedness. Neuropathic pain right hand specifically to digits 4 and 5       PAST MEDICAL HISTORY   No past medical history on file. SURGICAL HISTORY     Past Surgical History:   Procedure Laterality Date    ARM SURGERY      HAND SURGERY Right 2021       [address]       Discharge Medication List as of 6/29/2022 10:30 PM          ALLERGIES     Naprosyn [naproxen]    FAMILYHISTORY     No family history on file. SOCIAL HISTORY       Social History     Socioeconomic History    Marital status:      Spouse name: Not on file    Number of children: Not on file    Years of education: Not on file    Highest education level: Not on file   Occupational History    Not on file   Tobacco Use    Smoking status: Current Every Day Smoker     Packs/day: 0.50     Types: Cigarettes    Smokeless tobacco: Never Used   Substance and Sexual Activity    Alcohol use: Yes     Comment: occasionally     Drug use: No    Sexual activity: Not on file   Other Topics Concern    Not on file   Social History Narrative    Not on file     Social Determinants of Health     Financial Resource Strain:     Difficulty of Paying Living Expenses: Not on file   Food Insecurity:     Worried About Running Out of Food in the Last Year: Not on file    Lluvia of Food in the Last Year: Not on file   Transportation Needs:     Lack of Transportation (Medical):  Not on file    Lack of Transportation (Non-Medical): Not on file   Physical Activity:     Days of Exercise per Week: Not on file    Minutes of Exercise per Session: Not on file   Stress:     Feeling of Stress : Not on file   Social Connections:     Frequency of Communication with Friends and Family: Not on file    Frequency of Social Gatherings with Friends and Family: Not on file    Attends Synagogue Services: Not on file    Active Member of [address] [place] [address] or Organizations: Not on file    Attends Club or Organization Meetings: Not on file    Marital Status: Not on file   Intimate Partner Violence:     Fear of Current or Ex-Partner: Not on file    Emotionally Abused: Not on file    Physically Abused: Not on file    Sexually Abused: Not on file   Housing Stability:     Unable to Pay for Housing in the Last Year: Not on file    Number of [place] in the Last Year: Not on file    Unstable Housing in the Last Year: Not on file       SCREENINGS    Kathy Coma Scale  Eye Opening: Spontaneous  Best Verbal Response: Oriented  Best Motor Response: Obeys commands  Kathy Coma Scale Score: 15        PHYSICAL EXAM    (up to 7 for level 4, 8 or more for level 5)     ED Triage Vitals [06/29/22 2050]   BP Temp Temp Source Heart Rate Resp SpO2 Height Weight   (!) 154/98 97.3 °F (36.3 °C) Oral 80 18 99 % -- --       Physical Exam  Vitals and nursing note reviewed. Constitutional:       Appearance: He is not ill-appearing or diaphoretic. HENT:      Head: Normocephalic and atraumatic. Right Ear: External ear normal.      Left Ear: External ear normal.      Nose: Nose normal.      Mouth/Throat:      Mouth: Mucous membranes are moist.      Dentition: Abnormal dentition. Dental tenderness and dental caries present. No gingival swelling, dental abscesses or gum lesions. Pharynx: Oropharynx is clear. No oropharyngeal exudate or posterior oropharyngeal erythema. Comments: Extremely poor dentition. Extensive cavities to almost all teeth. Multiple teeth with avulsed edges. These do not appear to be new. No lacerations to lips and or gums. Tooth marked above is 1 patient is complaining of most pain to and states is what brought him in  Eyes:      General:         Right eye: No discharge. Left eye: No discharge. Conjunctiva/sclera:      Right eye: Right conjunctiva is injected. Left eye: Left conjunctiva is injected. Cardiovascular:      Rate and Rhythm: Normal rate and regular rhythm. Pulses: Normal pulses. Radial pulses are 2+ on the right side and 2+ on the left side. Heart sounds: Normal heart sounds. No murmur heard. No friction rub. No gallop. Pulmonary:      Effort: Pulmonary effort is normal. No respiratory distress. Breath sounds: Normal breath sounds. No stridor. No wheezing, rhonchi or rales. Abdominal:      General: Abdomen is flat. Palpations: Abdomen is soft. Musculoskeletal:         General: No swelling or tenderness. Normal range of motion. Cervical back: Normal range of motion and neck supple. No rigidity. Comments: Neurologically sensation is at patient's baseline to the right upper extremity with tingling to digits 4 and 5 which has been ongoing for the past several years. Cap refill, motor are all intact. Skin:     General: Skin is warm and dry. Capillary Refill: Capillary refill takes less than 2 seconds. Coloration: Skin is not pale. Findings: No bruising or rash. Neurological:      General: No focal deficit present. Mental Status: He is alert and oriented to person, place, and time. Psychiatric:         Mood and Affect: Mood normal.         Behavior: Behavior normal. Behavior is cooperative. DIAGNOSTIC RESULTS   LABS:    Labs Reviewed - No data to display    When ordered, only abnormal lab results are displayed. All other labs were within normal range or not returned as of this dictation. EKG:  When ordered, EKG's are interpreted by the Emergency Department Physician in the absence of a cardiologist.  Please see their note for interpretation of EKG. RADIOLOGY:   Non-plain film images such as CT, Ultrasound and MRI are read by the radiologist. Plain radiographic images are visualized andpreliminarily interpreted by the  ED Provider with the below findings:        Interpretation perthe Radiologist below, if available at the time of this note:    No orders to display     No results found. PROCEDURES   Unless otherwise noted below, none     Procedures    CRITICAL CARE TIME   Yasir GONZALEZ CNP, am the primary clinician of record  Total Critical Care time was 15 minutes, excluding separately reportable procedures. There was a high probability of clinically significant/life threatening deterioration in the patient's condition which required my urgent intervention. This time spent assessing, reassessing patient, chart review and discussing case with other providers      CONSULTS:  None      EMERGENCY DEPARTMENT COURSE and DIFFERENTIAL DIAGNOSIS/MDM:   Vitals:    Vitals:    06/29/22 2050 06/29/22 2243   BP: (!) 154/98    Pulse: 80    Resp: 18 18   Temp: 97.3 °F (36.3 °C)    TempSrc: Oral    SpO2: 99%        Patient was given thefollowing medications:  Medications   lidocaine viscous hcl (XYLOCAINE) 2 % solution 15 mL (15 mLs Mouth/Throat Given 6/29/22 2203)   acetaminophen (TYLENOL) tablet 1,000 mg (1,000 mg Oral Given 6/29/22 2203)   clove oil liquid ( Topical Given 6/29/22 2203)         Is this patient to be included in the SEP-1 Core Measure due to severe sepsis or septic shock? No   Exclusion criteria - the patient is NOT to be included for SEP-1 Core Measure due to:  2+ SIRS criteria are not met      Regarding dental pain:    Given Tylenol, clove oil and viscous lidocaine. Good symptom relief. Differential Diagnosis: Dental Abscess, Airway Obstruction, Ramakrishna's Angina, Bacteremia/Sepsis.   Denies dysphagia, dyspnea, neck stiffness, and odynophagia. Due to the location of the dental pain patient was not offered an inferior alveolar block    Patient seen and examined. In addition to the noted physical exam, I did not observe brawny edema, uvular deviation, menigismus, stridor, trismus, or drooling. Patient is tolerating saliva. Submandibular and sublingual areas are soft. Nontoxic appearance and no significant distress. Discussed treatment options with the patient and that the definitive care for this patient's symptoms is to see a dentist or an endodontist. No sign of Ramakrishnas Angina or deep space neck infection, and there is no airway compromise. Will start on antibiotics for dental infection and possible early abscess. There is nothing that I can see or palpate on exam that requires drainage at this time. Patient to obtain dental wax to cover the tooth and protect it from temperature changes and exposure to air when possible. Pain management and follow-up plan were discussed with the patient. It is understood that if the patient is not improving as expected or if other new symptoms or signs of concern develop, specifically pain, swelling of the face or neck or a fever, other etiologies or diagnoses may need to be considered requiring other tests, treatments, consultations, and/or admission. The diagnosis, plan, expected course, follow-up, and return precautions were discussed and all questions were answered. Regarding neuropathic pain:  See HPI for PDMP review. [place] to [place] in [place] for pain control. Does appear to have multiple orthopedic injuries. Do see visit with gabapentin prescribed in July of last year in [place]. Do not see any refills after August.  As such, uncomfortable starting patient on gabapentin without pain/PCP referral.  Patient unable/unwilling to tell me where he has been getting gabapentin from. Requesting Percocet 10s multiple times.   Had discussion with patient. He is agreeable to follow-up with PCP/pain. Will get one-time dose of gabapentin here. No opiates given in ED. See PE above the patient is neurovascularly intact. He is at his baseline sensation wise. Motor, cap refill intact. No recent trauma to warrant imaging. FINAL IMPRESSION      1. Dental caries    2. Dental infection    3. Cracked tooth    4. Neuropathic pain of hand, right          DISPOSITION/PLAN   DISPOSITION Decision To Discharge 06/29/2022 10:16:04 PM      PATIENT REFERREDTO:  [facility]) Pre-Services  [phone]  Call in 2 days  to get a PCP      DISCHARGE MEDICATIONS:  Discharge Medication List as of 6/29/2022 10:30 PM      START taking these medications    Details   penicillin v potassium (VEETID) 500 MG tablet Take 1 tablet by mouth 4 times daily for 7 days, Disp-28 tablet, R-0Normal      lidocaine viscous hcl (XYLOCAINE) 2 % SOLN solution Take 15 mLs by mouth every 4 hours as needed for Dental Pain or Pain Take 15 mLs by mouth every 4 hours as needed for Irritation or Pain.   You can swish and swallow or gargle, Disp-2 each, R-0Normal             DISCONTINUED MEDICATIONS:  Discharge Medication List as of 6/29/2022 10:30 PM                 (Please note that portions ofthis note were completed with a voice recognition program.  Efforts were made to edit the dictations but occasionally words are mis-transcribed.)    RADHA Zapata CNP (electronically signed)             RADHA Zapata CNP  06/30/22 9487

## 2024-07-12 ENCOUNTER — HOSPITAL ENCOUNTER (EMERGENCY)
Age: 34
Discharge: HOME | End: 2024-07-12
Payer: COMMERCIAL

## 2024-07-12 ENCOUNTER — APPOINTMENT (OUTPATIENT)
Dept: GENERAL RADIOLOGY | Age: 34
End: 2024-07-12
Payer: COMMERCIAL

## 2024-07-12 VITALS
HEART RATE: 75 BPM | SYSTOLIC BLOOD PRESSURE: 136 MMHG | WEIGHT: 212.74 LBS | RESPIRATION RATE: 17 BRPM | HEIGHT: 73 IN | OXYGEN SATURATION: 96 % | DIASTOLIC BLOOD PRESSURE: 95 MMHG | BODY MASS INDEX: 28.2 KG/M2 | TEMPERATURE: 98.2 F

## 2024-07-12 DIAGNOSIS — Z23 NEED FOR PROPHYLACTIC VACCINATION AGAINST DIPHTHERIA AND TETANUS: ICD-10-CM

## 2024-07-12 DIAGNOSIS — S51.811A LACERATION OF RIGHT FOREARM, INITIAL ENCOUNTER: Primary | ICD-10-CM

## 2024-07-12 PROCEDURE — 6370000000 HC RX 637 (ALT 250 FOR IP): Performed by: PHYSICIAN ASSISTANT

## 2024-07-12 PROCEDURE — 99284 EMERGENCY DEPT VISIT MOD MDM: CPT

## 2024-07-12 PROCEDURE — 6360000002 HC RX W HCPCS: Performed by: PHYSICIAN ASSISTANT

## 2024-07-12 PROCEDURE — 12004 RPR S/N/AX/GEN/TRK7.6-12.5CM: CPT

## 2024-07-12 PROCEDURE — 90471 IMMUNIZATION ADMIN: CPT | Performed by: PHYSICIAN ASSISTANT

## 2024-07-12 PROCEDURE — 90715 TDAP VACCINE 7 YRS/> IM: CPT | Performed by: PHYSICIAN ASSISTANT

## 2024-07-12 PROCEDURE — 73090 X-RAY EXAM OF FOREARM: CPT

## 2024-07-12 RX ORDER — LIDOCAINE HYDROCHLORIDE AND EPINEPHRINE 10; 10 MG/ML; UG/ML
20 INJECTION, SOLUTION INFILTRATION; PERINEURAL ONCE
Status: DISCONTINUED | OUTPATIENT
Start: 2024-07-12 | End: 2024-07-12 | Stop reason: HOSPADM

## 2024-07-12 RX ORDER — CEPHALEXIN 500 MG/1
500 CAPSULE ORAL 3 TIMES DAILY
Qty: 21 CAPSULE | Refills: 0 | Status: SHIPPED | OUTPATIENT
Start: 2024-07-12 | End: 2024-07-19

## 2024-07-12 RX ORDER — LIDOCAINE HYDROCHLORIDE AND EPINEPHRINE 10; 10 MG/ML; UG/ML
INJECTION, SOLUTION INFILTRATION; PERINEURAL
Status: DISCONTINUED
Start: 2024-07-12 | End: 2024-07-12 | Stop reason: HOSPADM

## 2024-07-12 RX ORDER — OXYCODONE HYDROCHLORIDE AND ACETAMINOPHEN 5; 325 MG/1; MG/1
1 TABLET ORAL ONCE
Status: COMPLETED | OUTPATIENT
Start: 2024-07-12 | End: 2024-07-12

## 2024-07-12 RX ORDER — CEPHALEXIN 500 MG/1
1000 CAPSULE ORAL ONCE
Status: COMPLETED | OUTPATIENT
Start: 2024-07-12 | End: 2024-07-12

## 2024-07-12 RX ADMIN — CEPHALEXIN 1000 MG: 500 CAPSULE ORAL at 14:22

## 2024-07-12 RX ADMIN — TETANUS TOXOID, REDUCED DIPHTHERIA TOXOID AND ACELLULAR PERTUSSIS VACCINE, ADSORBED 0.5 ML: 5; 2.5; 8; 8; 2.5 SUSPENSION INTRAMUSCULAR at 14:22

## 2024-07-12 RX ADMIN — OXYCODONE HYDROCHLORIDE AND ACETAMINOPHEN 1 TABLET: 5; 325 TABLET ORAL at 13:01

## 2024-07-12 ASSESSMENT — PAIN - FUNCTIONAL ASSESSMENT
PAIN_FUNCTIONAL_ASSESSMENT: 0-10
PAIN_FUNCTIONAL_ASSESSMENT: PREVENTS OR INTERFERES SOME ACTIVE ACTIVITIES AND ADLS

## 2024-07-12 ASSESSMENT — PAIN SCALES - GENERAL
PAINLEVEL_OUTOF10: 10
PAINLEVEL_OUTOF10: 4
PAINLEVEL_OUTOF10: 6
PAINLEVEL_OUTOF10: 6

## 2024-07-12 ASSESSMENT — PAIN DESCRIPTION - DESCRIPTORS: DESCRIPTORS: ACHING

## 2024-07-12 ASSESSMENT — LIFESTYLE VARIABLES
HOW MANY STANDARD DRINKS CONTAINING ALCOHOL DO YOU HAVE ON A TYPICAL DAY: 5 OR 6
HOW OFTEN DO YOU HAVE A DRINK CONTAINING ALCOHOL: 4 OR MORE TIMES A WEEK

## 2024-07-12 ASSESSMENT — PAIN DESCRIPTION - LOCATION
LOCATION: ARM
LOCATION: ARM

## 2024-07-12 ASSESSMENT — PAIN DESCRIPTION - PAIN TYPE: TYPE: ACUTE PAIN

## 2024-07-12 ASSESSMENT — PAIN DESCRIPTION - ORIENTATION: ORIENTATION: RIGHT

## 2024-07-12 NOTE — DISCHARGE INSTRUCTIONS
Primary closure of the 8 cm wound.  X-ray negative.  Tetanus updated.  First dose antibiotic given in ED.  Splint applied.  Recommend wound check in 48 hours.  Basically dressing removal and inspection wound.  If concerns return to this facility.  Otherwise contact orthopedic hand specialist Dr. Ramu Bhatti or associate for an appointment on Monday for wound check.  They will also need to reevaluate the sensation and the dorsal aspect of the first webspace, right hand.  I do recommend ibuprofen 6 and milligram 3 times a day and Tylenol 1000 mg 2 times a day for pain control.  Elevation is important.  Ice application may benefit.  Antibiotic sent to local pharmacy.

## 2024-07-12 NOTE — ED PROVIDER NOTES
[facility] EMERGENCY DEPARTMENT  EMERGENCY DEPARTMENT ENCOUNTER        Pt Name: Adrian Camilo  MRN: [id number]  Birthdate 1990  Date of evaluation: 7/12/2024  Provider: Luis Barr PA-C  PCP: No primary care provider on file.  Note Started: 2:18 PM EDT 7/12/24      KALI. I have evaluated this patient.        CHIEF COMPLAINT       Chief Complaint   Patient presents with    Laceration     Pt works as a  and had a transmission fall on his right arm. Pt reports long laceration and immediatly applied pressure/truncate. No active bleed noted on arrival with dressing.        HISTORY OF PRESENT ILLNESS: 1 or more Elements     History From: Patient    Adrian Camilo is a 34 y.o. male who presents to the emerged part with a laceration dominant right forearm radial and distal third.  See picture.  Indicates some mild altered sensation over the dorsal first webspace.  Good function of the wrist and hand.  2 point discrimination intact documented.  Patient brought in by EMS.  They establish an IV and did administer fentanyl 100 mcg IV.  Patient comfortable.  Tetanus shot 2016 he consents to update.    Nursing Notes were all reviewed and agreed with or any disagreements were addressed in the HPI.    REVIEW OF SYSTEMS :      Review of Systems    Positives and Pertinent negatives as per HPI.     SURGICAL HISTORY     Past Surgical History:   Procedure Laterality Date    ARM SURGERY      HAND SURGERY Right 2021       CURRENTMEDICATIONS       Discharge Medication List as of 7/12/2024  2:55 PM          ALLERGIES     Naprosyn [naproxen]    FAMILYHISTORY     No family history on file.     SOCIAL HISTORY       Social History     Tobacco Use    Smoking status: Every Day     Current packs/day: 0.50     Types: Cigarettes    Smokeless tobacco: Never   Substance Use Topics    Alcohol use: Yes     Comment: occasionally     Drug use: No       SCREENINGS          Kathy Coma Scale  Eye Opening:  Expectation of Test or Tx.):     Patient primary closure.  Patient follows with Dr. Ramu Bhatti on Monday for reassessment by specialist.  This is because of the altered sensation that he describes on the dorsal first webspace.  He had upgoing thumb good movement at the wrist, digits with to point discrimination noted all digits including thumb and index finger.  I will start patient on Keflex 500 mg 3 times daily 1 week.  Ibuprofen 6 and milligram 3 times daily plus Tylenol 1000 mg 3 times daily.  Wear splint till cleared by orthopedist.  The patient and his sister both expressed understanding of the diagnosis and treatment plan.      I am the Primary Clinician of Record.  FINAL IMPRESSION      1. Laceration of right forearm, initial encounter    2. Need for prophylactic vaccination against diphtheria and tetanus          DISPOSITION/PLAN     DISPOSITION Decision To Discharge 07/12/2024 02:22:45 PM      PATIENT REFERRED TO:  Ramu Bhatti MD  [address]  [address]  [facility] [address]  [phone]    Schedule an appointment as soon as possible for a visit in 3 days      [facility] Emergency Department  [address] [address]  [phone]  Go to   If symptoms worsen      DISCHARGE MEDICATIONS:  Discharge Medication List as of 7/12/2024  2:55 PM        START taking these medications    Details   cephALEXin (KEFLEX) 500 MG capsule Take 1 capsule by mouth 3 times daily for 7 days, Disp-21 capsule, R-0Normal             DISCONTINUED MEDICATIONS:  Discharge Medication List as of 7/12/2024  2:55 PM                 (Please note that portions of this note were completed with a voice recognition program.  Efforts were made to edit the dictations but occasionally words are mis-transcribed.)    Luis Barr PA-C (electronically signed)        Luis Barr PA-C  07/13/24 0819

## 2024-07-12 NOTE — ED TRIAGE NOTES
Pt works as a  and had a transmission fall on his right arm. Pt reports long laceration and immediatly applied pressure/truncate. No active bleed noted on arrival with dressing.